# Patient Record
Sex: FEMALE
[De-identification: names, ages, dates, MRNs, and addresses within clinical notes are randomized per-mention and may not be internally consistent; named-entity substitution may affect disease eponyms.]

---

## 2019-08-16 PROBLEM — Z00.00 ENCOUNTER FOR PREVENTIVE HEALTH EXAMINATION: Status: ACTIVE | Noted: 2019-08-16

## 2019-09-05 ENCOUNTER — APPOINTMENT (OUTPATIENT)
Dept: ORTHOPEDIC SURGERY | Facility: CLINIC | Age: 39
End: 2019-09-05

## 2019-09-10 ENCOUNTER — APPOINTMENT (OUTPATIENT)
Dept: ORTHOPEDIC SURGERY | Facility: CLINIC | Age: 39
End: 2019-09-10
Payer: COMMERCIAL

## 2019-09-10 VITALS — WEIGHT: 210 LBS | HEIGHT: 69 IN | BODY MASS INDEX: 31.1 KG/M2

## 2019-09-10 DIAGNOSIS — M17.0 BILATERAL PRIMARY OSTEOARTHRITIS OF KNEE: ICD-10-CM

## 2019-09-10 PROCEDURE — 99204 OFFICE O/P NEW MOD 45 MIN: CPT

## 2019-09-10 NOTE — DISCUSSION/SUMMARY
[de-identified] : Patient will be sent to Dr. Brian Ewing for evaluation for either a lateral meniscus a subtotal meniscectomy or repair of the meniscus if possible.

## 2019-09-10 NOTE — HISTORY OF PRESENT ILLNESS
[de-identified] : HAS HISTORY OF RIGHT KNEE PAIN - HAS PROGRESSED OVER THE PAST FEW MONTHS. \par \par DIFFICULTY STANDING LONG PERIODS AND SOMETIMES USING STAIRS. REPORTS NO ACCIDENTS OR FALLS RECENTLY. \par \par HAS AN MRI FROM 08/09/19 - SENT BY DR. MIKE FOR RIGHT LATERAL MENISCAL TEAR, Patient complained of intermittent locking and catching ever since a twisting injury a few months ago. She is quite anxious because she is feeling insecure. The MRI results indicate a handle tear of the lateral meniscus of the right knee.

## 2019-09-10 NOTE — PHYSICAL EXAM
[de-identified] : Physical exam today patient has a lateral joint line tenderness range of motion of the right knee 0-125°No evidence today of any anterior or lateral instability. Positive Yessi sign noted.

## 2019-09-12 ENCOUNTER — TRANSCRIPTION ENCOUNTER (OUTPATIENT)
Age: 39
End: 2019-09-12

## 2019-09-12 ENCOUNTER — APPOINTMENT (OUTPATIENT)
Dept: ORTHOPEDIC SURGERY | Facility: CLINIC | Age: 39
End: 2019-09-12
Payer: COMMERCIAL

## 2019-09-12 ENCOUNTER — APPOINTMENT (OUTPATIENT)
Dept: ORTHOPEDIC SURGERY | Facility: CLINIC | Age: 39
End: 2019-09-12

## 2019-09-12 PROCEDURE — 99203 OFFICE O/P NEW LOW 30 MIN: CPT

## 2019-09-12 NOTE — ASSESSMENT
[FreeTextEntry1] : Discussed with patient at length symptoms and diagnosis.  Lengthy discussion with patient regarding nonoperative and operative risks and benefits as well as surgical expectations and postop protocols and expectations, including the possibility that surgery may fail to satisfactorily resolve patient's condition / symptoms.   Patient informed that radiologic imaging as well as physical exam are aids to helping determine treatment plans/recommendations and that intra-operative evaluation will be undertaken and any surgical treatment will take intra-operative evaluation into consideration to aid in improving the patient's symptoms/condition.  Patient expresses understanding and patient's questions were answered.  Patient ELECTS to proceed with surgery.\par

## 2019-09-12 NOTE — PHYSICAL EXAM
[de-identified] : Right knee\par \par Constitutional: \par The patient is healthy-appearing and in no apparent distress.  Patient is overweight.\par \par Gait:\par The patient ambulates with a normal gait and no limp.\par \par Cardiovascular System: \par The capillary refill is less than 2 seconds. \par \par Skin: \par There are no skin abnormalities.\par \par Right Knee:\par  \par Bony Palpation: \par There is no tenderness of the medial joint line. \par There is tenderness of the lateral joint line.\par There is no tenderness of the medial femoral chondyle.\par There is no tenderness of the lateral femoral chondyle.\par There is no tenderness of the tibial tubercle.\par There is no tenderness of the superior patella.\par There is no tenderness of the inferior patella.\par There is no tenderness of the medial patellar facet.\par There is no tenderness of the lateral patellar facet.\par \par Soft Tissue Palpation: \par There is no tenderness of the medial retinaculum.\par There is no tenderness of the lateral retinaculum.\par There is no tenderness of the quadriceps tendon.\par There is no tenderness of the patella tendon.\par There is no tenderness of the ITB.\par There is no tenderness of the pes anserine.\par \par Active Range of Motion: \par The range of motion at the knee actively and passively is 3 - 125. \par \par Special Tests: \par There is a positive Apley.\par There is a positive Steinmanns. \par There is a mild Lachman and Anterior Drawer.\par There is a negative Posterior Drawer.  \par There is no varus or valgus laxity.\par \par Strength: \par There is 5/5 hip flexion and 5/5 knee flexion and extension.  \par \par Psychiatric: \par The patient demonstrates a normal mood and affect and is active and alert\par \par  [de-identified] : MRI right knee. There is a massive large displaced bucket-handle tear lateral meniscus with the entire posterior portion displaced. There is a partial tear of the ACL with buckling secondary to the displaced meniscal fragment.

## 2019-09-12 NOTE — HISTORY OF PRESENT ILLNESS
[de-identified] : \par Patient is presenting for evaluation of over one half years of right knee pain. She states in January 2018 sustained injury to her knee with pain and swelling and subsequently episodes in the last year and a half of swelling and she was seen at an outside clinic where she was given cortisone injections. She was seen 2 days ago by Dr. Oliva who obtained an MRI which revealed a bucket-handle tear of the lateral meniscus. MRI report as well as imaging is available for review. Patient complains of intermittent swelling and inability to fully extend the knee and flex the knee secondary to pain

## 2019-10-03 ENCOUNTER — OTHER (OUTPATIENT)
Age: 39
End: 2019-10-03

## 2019-10-03 RX ORDER — OXYCODONE AND ACETAMINOPHEN 5; 325 MG/1; MG/1
5-325 TABLET ORAL
Qty: 30 | Refills: 0 | Status: ACTIVE | COMMUNITY
Start: 2019-10-03 | End: 1900-01-01

## 2019-10-04 ENCOUNTER — APPOINTMENT (OUTPATIENT)
Dept: ORTHOPEDIC SURGERY | Facility: AMBULATORY SURGERY CENTER | Age: 39
End: 2019-10-04
Payer: COMMERCIAL

## 2019-10-04 PROCEDURE — 29882 ARTHRS KNE SRG MNISC RPR M/L: CPT | Mod: RT,59

## 2019-10-04 PROCEDURE — 29888 ARTHRS AID ACL RPR/AGMNTJ: CPT | Mod: RT

## 2019-10-09 ENCOUNTER — APPOINTMENT (OUTPATIENT)
Dept: ORTHOPEDIC SURGERY | Facility: CLINIC | Age: 39
End: 2019-10-09

## 2019-10-10 ENCOUNTER — APPOINTMENT (OUTPATIENT)
Dept: ORTHOPEDIC SURGERY | Facility: CLINIC | Age: 39
End: 2019-10-10
Payer: COMMERCIAL

## 2019-10-10 PROCEDURE — 99024 POSTOP FOLLOW-UP VISIT: CPT

## 2019-10-10 NOTE — HISTORY OF PRESENT ILLNESS
[de-identified] : Status post right knee arthroscopy with lateral distal repair and ACL repair\par  [de-identified] : On exam of the knee postop brace and dressing is intact. The dressing was removed and sutures removed without difficulty and Steri-Stripped. There is a negative Aleksandr sign. There is a mild effusion of the knee consistent with expected postop. No tenderness to mediolateral joint line. [de-identified] : Patient states overall she is doing well not taking any current pain medication. She is wearing a brace and states she's been nonweightbearing taking a daily aspirin.\par  [de-identified] : Status post right knee arthroscopy with lateral distal repair and ACL repair\par  [de-identified] : Patient be strict nonweightbearing for a total of 2 weeks and then weightbearing as tolerated in the extension in the brace. Patient to begin passive range of motion exercises ear to 70° in his chart to begin physical therapy in one week. I will see the patient back in 2 weeks activity or shortness discussed.

## 2019-10-24 ENCOUNTER — APPOINTMENT (OUTPATIENT)
Dept: ORTHOPEDIC SURGERY | Facility: CLINIC | Age: 39
End: 2019-10-24
Payer: COMMERCIAL

## 2019-10-24 PROCEDURE — 99024 POSTOP FOLLOW-UP VISIT: CPT

## 2019-10-24 NOTE — HISTORY OF PRESENT ILLNESS
[de-identified] : Status post right knee arthroscopy with lateral distal repair and ACL repair\par  [de-identified] : Patient states overall she is doing well not taking any current pain medication. She is wearing a brace and states she's been nonweightbearing taking a daily aspirin.\par  [de-identified] : On exam of the knee postop brace is intact. There is a negative Aleksandr sign. There is a mild effusion of the knee consistent with expected postop. No tenderness to mediolateral joint line.  AROM and PROM is 0 - 40. [de-identified] : Status post right knee arthroscopy with lateral distal repair and ACL repair\par  [de-identified] : Discussed at length with the patient need to advance range of motion particularly flexion. She may again weightbearing as tolerated and the shot did wear the brace the next day or 2 until she becomes more comfortable. Followup in 2 weeks

## 2019-11-07 ENCOUNTER — APPOINTMENT (OUTPATIENT)
Dept: ORTHOPEDIC SURGERY | Facility: CLINIC | Age: 39
End: 2019-11-07
Payer: COMMERCIAL

## 2019-11-07 PROCEDURE — 99024 POSTOP FOLLOW-UP VISIT: CPT

## 2019-11-07 NOTE — HISTORY OF PRESENT ILLNESS
[de-identified] : Status post right knee arthroscopy with lateral meniscal repair and ACL repair\par  [de-identified] : Patient states overall she is doing well not taking any current pain medication. \par She is going to PT [de-identified] : On exam of the right knee patient has full extension actively to flexion of 95° and full extension passively to flexion of 100° with pain at end range. There is no tenderness to mediolateral joint line. There is minimal tenderness the medial lateral patella facet. There is negative anterior drawer. There is 5 out of 5 knee flexion extension. There is a negative Aleksandr. [de-identified] : Status post right knee arthroscopy with lateral meniscal repair and ACL repair\par  [de-identified] : Discussed at length with patient exam overall clinical progress. Continue physical therapy and advance range of motion. Followup in one month.

## 2019-12-05 ENCOUNTER — APPOINTMENT (OUTPATIENT)
Dept: ORTHOPEDIC SURGERY | Facility: CLINIC | Age: 39
End: 2019-12-05
Payer: COMMERCIAL

## 2019-12-05 DIAGNOSIS — S83.281S OTHER TEAR OF LATERAL MENISCUS, CURRENT INJURY, RIGHT KNEE, SEQUELA: ICD-10-CM

## 2019-12-05 DIAGNOSIS — S83.519A SPRAIN OF ANTERIOR CRUCIATE LIGAMENT OF UNSPECIFIED KNEE, INITIAL ENCOUNTER: ICD-10-CM

## 2019-12-05 PROCEDURE — 99024 POSTOP FOLLOW-UP VISIT: CPT

## 2019-12-05 NOTE — HISTORY OF PRESENT ILLNESS
[de-identified] : Status post right knee arthroscopy with lateral meniscal repair and ACL repair\par  [de-identified] : Patient states overall she is doing well not taking any current pain medication. \par She is going to PT [de-identified] : On exam of the right knee patient has full extension actively to flexion of 95° and full extension passively to flexion of 110° with pain at end range. There is no tenderness to mediolateral joint line. There is minimal tenderness the medial lateral patella facet. There is negative anterior drawer. There is 5 out of 5 knee flexion extension. There is a negative Aleksandr. [de-identified] : Status post right knee arthroscopy with lateral meniscal repair and ACL repair\par  [de-identified] : Discussed at length with patient exam overall clinical progress. Continue physical therapy and advance range of motion. Followup in one month.

## 2020-01-09 ENCOUNTER — APPOINTMENT (OUTPATIENT)
Dept: ORTHOPEDIC SURGERY | Facility: CLINIC | Age: 40
End: 2020-01-09

## 2020-01-15 ENCOUNTER — APPOINTMENT (OUTPATIENT)
Dept: ORTHOPEDIC SURGERY | Facility: CLINIC | Age: 40
End: 2020-01-15